# Patient Record
(demographics unavailable — no encounter records)

---

## 2024-10-15 NOTE — REASON FOR VISIT
[FreeTextEntry1] : 82 year-old female with PAF on Holter, SSS s/p PPM ([employer]) 6/9/17 at [facility], aortic aneurysm (4.2 cm), HTN, HLD, presents for followup.    Patient was last seen on 7/2/24- Patient denies CP, SOB, palpitations, or lightheadedness.   She is on Eliquis 2.5 mg BID for stroke prevention. She is on Diltiazem 120 mg for AFIB suppression and rate control. She is on Losartan 50 mg for HTN.  Patient underwent an echocardiogram 10/5/23 and it showed normal LV function, mild dilatation of the ascending aorta (4.2 cm), without significant valvular pathology.   Patient underwent a treadmill stress test 7/11/22 and completed 6.5 minutes of Lobo protocol.  There was no ECG evidence of ischemia.  Following treadmill stress, there was no echocardiographic evidence of ischemia.  Pt underwent a CXR on 7/20/22 and it showed scarring within right mid lung and left lung base.   Patient underwent an echocardiogram 4/20/22 and it showed normal LV function, mild dilatation of the ascending aorta (4.1 cm), without significant valvular pathology.  She is on Eliquis 2.5 mg BID for stroke prevention.  She is on Diltiazem 120 mg for AFIB suppression and rate control.  She is on Losartan 25 mg for HTN.  Patient underwent an echocardiogram 7/16/20 and it showed normal LV function, mild dilatation of the ascending aorta (3.9 cm), without significant valvular pathology.

## 2024-10-15 NOTE — REASON FOR VISIT
[FreeTextEntry1] : 82 year-old female with PAF on Holter, SSS s/p PPM ([employer]) 6/9/17 at [facility], aortic aneurysm (4.2 cm), HTN, HLD, presents for followup.    Patient was last seen on 7/2/24- Patient denies CP, SOB, palpitations, or lightheadedness.   She is on Eliquis 2.5 mg BID for stroke prevention. She is on Diltiazem 120 mg for AFIB suppression and rate control. She is on Losartan 50 mg for HTN.  Patient underwent an echocardiogram 10/5/23 and it showed normal LV function, mild dilatation of the ascending aorta (4.2 cm), without significant valvular pathology.   Patient underwent a treadmill stress test 7/11/22 and completed 6.5 minutes of Lobo protocol.  There was no ECG evidence of ischemia.  Following treadmill stress, there was no echocardiographic evidence of ischemia.  Pt underwent a CXR on 7/20/22 and it showed scarring within right mid lung and left lung base.   Patient underwent an echocardiogram 4/20/22 and it showed normal LV function, mild dilatation of the ascending aorta (4.1 cm), without significant valvular pathology.  She is on Eliquis 2.5 mg BID for stroke prevention.  She is on Diltiazem 120 mg for AFIB suppression and rate control.  She is on Losartan 25 mg for HTN.  Patient underwent an echocardiogram 7/16/20 and it showed normal LV function, mild dilatation of the ascending aorta (3.9 cm), without significant valvular pathology.

## 2024-10-15 NOTE — HISTORY OF PRESENT ILLNESS
[FreeTextEntry1] : 10/16/24 -   7/2/24- Patient denies CP, SOB, palpitations, or lightheadedness.   4/17/24 - Patient reports home BP of 140-150. Patient denies CP, SOB, palpitations, or lightheadedness. BP is 160-160-160.    10/5/23 - Patient reports SOB when it is humid. Patient is now on Eliquis 5 mg BID. She is on Diltiazem for AFIB suppression and on Losartan 50 mg for HTN. Patient denies CP. Patient denies palpitations. Patient denies lightheadedness. I advised patient to undergo an echocardiogram.  Patient underwent an echocardiogram and it showed normal LV function, mild dilatation of the ascending aorta (4.2 cm), without significant valvular pathology.   3/8/23 - Patient  with PAF on Holter, SSS s/p PPM ([employer]), aortic aneurysm (4.0 cm), HTN, HLD, who needs cardiac clearance prior to EGD and colonoscopy. She is on Eliquis 2.5 mg BID for stroke prevention. She is on Diltiazem 120 mg for AFIB suppression and rate control. She is on Losartan 50 mg for HTN. Patient has been stable. Patient is cleared for EGD, colonoscopy, and anesthesia. She may hold Eliquis for 2 days prior to procedures.  7/11/22-  Patient feels increasing SOB. Yesterday for the whole day she cannot do any activity. Patient felt that it was due to poor sleep. I advised patient to undergo stress. Patient has not had her pacemaker checked. I advised patient to get appointment for pacemaker check. Patient would like to get oxygen tank but her oxygen saturation level is 96. I advised patient to undergo an echocardiogram and a treadmill stress test. I advised patient to get CXR.  Patient underwent a treadmill stress test and completed 6.5 minutes of Lobo protocol.  There was no ECG evidence of ischemia.  Following treadmill stress, there was no echocardiographic evidence of ischemia.  Pt underwent a CXR on 7/20/22 and it showed scarring within right mid lung and left lung base.    4/20/22 - Patient is feeling well.. Patient denies CP, SOB, palpitations, or lightheadedness. Patient had some difficulty walking due to recent fall. Patient has cough from hay fever.I advised patient to undergo an echocardiogram to followup on aortic aneurysm.  Patient underwent an echocardiogram and it showed normal LV function, mild dilatation of the ascending aorta (4.1 cm), without significant valvular pathology.  FU in 6 months.    10/7/21 - Patient has been stable.  Patient denies CP, SOB, palpitations, or lightheadedness.  Her BP was good.  I advised patient to continue current medications.  Her PPM was interrogated and battery life is 5.5 years.  FU 6 months.  She also needs cardiac clearance prior to cataract surgery.  4/1/21 - Patient has been stable.  Patient denies CP, SOB, palpitations, or lightheadedness.  Her BP was elevated.  Patient reports that her BP is around 140 at home.  I advised patient to start Losartan 25 mg.  Dose may need to be increased if BP remains borderline at home.  FU 6 months.   7/16/20 - Patient reports SOB with exertion for 2 days lasting half a day and sharp L CP lasting seconds. Patient denies lightheadedness. Patient denies palpitations. I advised patient to undergo an echocardiogram and a treadmill stress test.   7/19/17 - 74-year-old female with PAF on Holter, SSS s/p PPM , bradycardia, HTN and HLD presents for followup. Patient was last seen on 4/6/17 for PAF and bradycardia on Holter. She was advised to add Propafenone 150 mg BID. She was advised to see EP for possible PPM to allow us to give her beta-blocker. Patient underwent PPM placement on 6/9/17. Patient reports occasional CP and SOB not related to exertion. Patient reports decreased palpitations. She is on Eliquis for stroke prevention. She is on Diltiazem 30 mg TID and Propafenone 150 mg BID for AFIB suppression. She takes Valsartan HCTZ 80-12.5 mg for HTN.  1/4/17 initial visit - 74-year-old female with HTN and HLD presents for evaluation of abnormal ECG and palpitations. Patient reports no cardiac history. She was noted to have an abnormal ECG, showing aberrantly conducted APC's vs. PVC's. Patient denies chest pain. Patient reports SOB with exertion. Patient reports a lng history of anxiety attacks, described as palpitations and fast heartbeats lasting minutes. Patient denies history of syncope.

## 2024-10-15 NOTE — HISTORY OF PRESENT ILLNESS
[FreeTextEntry1] : 10/16/24 -   7/2/24- Patient denies CP, SOB, palpitations, or lightheadedness.   4/17/24 - Patient reports home BP of 140-150. Patient denies CP, SOB, palpitations, or lightheadedness. BP is 160-160-160.    10/5/23 - Patient reports SOB when it is humid. Patient is now on Eliquis 5 mg BID. She is on Diltiazem for AFIB suppression and on Losartan 50 mg for HTN. Patient denies CP. Patient denies palpitations. Patient denies lightheadedness. I advised patient to undergo an echocardiogram.  Patient underwent an echocardiogram and it showed normal LV function, mild dilatation of the ascending aorta (4.2 cm), without significant valvular pathology.   3/8/23 - Patient  with PAF on Holter, SSS s/p PPM ([employer]), aortic aneurysm (4.0 cm), HTN, HLD, who needs cardiac clearance prior to EGD and colonoscopy. She is on Eliquis 2.5 mg BID for stroke prevention. She is on Diltiazem 120 mg for AFIB suppression and rate control. She is on Losartan 50 mg for HTN. Patient has been stable. Patient is cleared for EGD, colonoscopy, and anesthesia. She may hold Eliquis for 2 days prior to procedures.  7/11/22-  Patient feels increasing SOB. Yesterday for the whole day she cannot do any activity. Patient felt that it was due to poor sleep. I advised patient to undergo stress. Patient has not had her pacemaker checked. I advised patient to get appointment for pacemaker check. Patient would like to get oxygen tank but her oxygen saturation level is 96. I advised patient to undergo an echocardiogram and a treadmill stress test. I advised patient to get CXR.  Patient underwent a treadmill stress test and completed 6.5 minutes of Lobo protocol.  There was no ECG evidence of ischemia.  Following treadmill stress, there was no echocardiographic evidence of ischemia.  Pt underwent a CXR on 7/20/22 and it showed scarring within right mid lung and left lung base.    4/20/22 - Patient is feeling well.. Patient denies CP, SOB, palpitations, or lightheadedness. Patient had some difficulty walking due to recent fall. Patient has cough from hay fever.I advised patient to undergo an echocardiogram to followup on aortic aneurysm.  Patient underwent an echocardiogram and it showed normal LV function, mild dilatation of the ascending aorta (4.1 cm), without significant valvular pathology.  FU in 6 months.    10/7/21 - Patient has been stable.  Patient denies CP, SOB, palpitations, or lightheadedness.  Her BP was good.  I advised patient to continue current medications.  Her PPM was interrogated and battery life is 5.5 years.  FU 6 months.  She also needs cardiac clearance prior to cataract surgery.  4/1/21 - Patient has been stable.  Patient denies CP, SOB, palpitations, or lightheadedness.  Her BP was elevated.  Patient reports that her BP is around 140 at home.  I advised patient to start Losartan 25 mg.  Dose may need to be increased if BP remains borderline at home.  FU 6 months.   7/16/20 - Patient reports SOB with exertion for 2 days lasting half a day and sharp L CP lasting seconds. Patient denies lightheadedness. Patient denies palpitations. I advised patient to undergo an echocardiogram and a treadmill stress test.   7/19/17 - 74-year-old female with PAF on Holter, SSS s/p PPM , bradycardia, HTN and HLD presents for followup. Patient was last seen on 4/6/17 for PAF and bradycardia on Holter. She was advised to add Propafenone 150 mg BID. She was advised to see EP for possible PPM to allow us to give her beta-blocker. Patient underwent PPM placement on 6/9/17. Patient reports occasional CP and SOB not related to exertion. Patient reports decreased palpitations. She is on Eliquis for stroke prevention. She is on Diltiazem 30 mg TID and Propafenone 150 mg BID for AFIB suppression. She takes Valsartan HCTZ 80-12.5 mg for HTN.  1/4/17 initial visit - 74-year-old female with HTN and HLD presents for evaluation of abnormal ECG and palpitations. Patient reports no cardiac history. She was noted to have an abnormal ECG, showing aberrantly conducted APC's vs. PVC's. Patient denies chest pain. Patient reports SOB with exertion. Patient reports a lng history of anxiety attacks, described as palpitations and fast heartbeats lasting minutes. Patient denies history of syncope.

## 2025-05-15 NOTE — PHYSICAL EXAM
[General Appearance - Well Developed] : well developed [Normal Appearance] : normal appearance [Well Groomed] : well groomed [General Appearance - Well Nourished] : well nourished [No Deformities] : no deformities [General Appearance - In No Acute Distress] : no acute distress [Normal Conjunctiva] : the conjunctiva exhibited no abnormalities [Eyelids - No Xanthelasma] : the eyelids demonstrated no xanthelasmas [Normal Oral Mucosa] : normal oral mucosa [No Oral Pallor] : no oral pallor [No Oral Cyanosis] : no oral cyanosis [Normal Jugular Venous A Waves Present] : normal jugular venous A waves present [Normal Jugular Venous V Waves Present] : normal jugular venous V waves present [No Jugular Venous Ac A Waves] : no jugular venous ac A waves [Respiration, Rhythm And Depth] : normal respiratory rhythm and effort [Exaggerated Use Of Accessory Muscles For Inspiration] : no accessory muscle use [Auscultation Breath Sounds / Voice Sounds] : lungs were clear to auscultation bilaterally [Heart Rate And Rhythm] : heart rate and rhythm were normal [Heart Sounds] : normal S1 and S2 [Murmurs] : no murmurs present [Arterial Pulses Normal] : the arterial pulses were normal [Edema] : no peripheral edema present [Abdomen Soft] : soft [Abdomen Tenderness] : non-tender [Abdomen Mass (___ Cm)] : no abdominal mass palpated [Abnormal Walk] : normal gait [Gait - Sufficient For Exercise Testing] : the gait was sufficient for exercise testing [Nail Clubbing] : no clubbing of the fingernails [Cyanosis, Localized] : no localized cyanosis [Petechial Hemorrhages (___cm)] : no petechial hemorrhages [] : no ischemic changes [Oriented To Time, Place, And Person] : oriented to person, place, and time [Affect] : the affect was normal [Mood] : the mood was normal [No Anxiety] : not feeling anxious

## 2025-05-16 NOTE — HISTORY OF PRESENT ILLNESS
[FreeTextEntry1] : Betsy Small is an 82-year-old female with PMH of TBS s/p dual chamber [employer] pacemaker implant 6/9/17, PAF (on Eliquis), aortic aneurysm (4.1 cm), HTN and HLD presents for routine device interrogation. Patient without complaints. She denies chest pain, SOB, palpitation, lightheadedness, or syncope. When travels by jet to [place], she develops lower extremity edema. Of note, her ventricular pacing thresholds are chronically high; however, the pacemaker rarely paces in the ventricle since she has sinus node dysfunction and not heart block.

## 2025-05-16 NOTE — DISCUSSION/SUMMARY
[EKG obtained to assist in diagnosis and management of assessed problem(s)] : EKG obtained to assist in diagnosis and management of assessed problem(s) [FreeTextEntry1] : 1. TBS: s/p dual chamber [employer] PPM placement. EKG performed today to assess for appropriate pacing and reveals atrial pacing with V sensing. Device check today revealed PPM in good working status with adequate pacing/sensing thresholds. Note that her RV pacing thresholds are chronically elevated, V paces <0.1%. Not pacemaker dependent. Underlying rhythm: SB/SR 50's. Stored data revealed a brief run of PAT in January 2025. Follow up in clinic in 6 months for device check. Attempt to get patient remote monitoring. Two years to DARLENE.   2. Hx PAF. [id number] Score of 4. Currently in SR. Continue Eliquis 5 mg BID for stroke prevention (given age 80, Wt> 60Kg, Crt <1.5).  3. HTN: Resume oral antihypertensives as prescribed. Encouraged heart healthy diet and sodium restriction. Continue regular f/u with Cardiologist for further HTN management.  Sincerely,  Lobo Sahni MD.

## 2025-05-16 NOTE — DISCUSSION/SUMMARY
[EKG obtained to assist in diagnosis and management of assessed problem(s)] : EKG obtained to assist in diagnosis and management of assessed problem(s) [FreeTextEntry1] : 1. TBS: s/p dual chamber [employer] PPM placement. EKG performed today to assess for appropriate pacing and reveals atrial pacing with V sensing. Device check today revealed PPM in good working status with adequate pacing/sensing thresholds. Note that her RV pacing thresholds are chronically elevated, V paces <0.1%. Not pacemaker dependent. Underlying rhythm: SB/SR 50's. Stored data revealed a brief run of PAT in January 2025. Follow up in clinic in 6 months for device check. Attempt to get patient remote monitoring. Two years to DARLENE.   2. Hx PAF. [id number] Score of 4. Currently in SR. Continue Eliquis 5 mg BID for stroke prevention (given age 80, Wt> 60Kg, Crt <1.5).  3. HTN: Resume oral antihypertensives as prescribed. Encouraged heart healthy diet and sodium restriction. Continue regular f/u with Cardiologist for further HTN management.  Sincerely,  Lobo Sahni MD.

## 2025-05-16 NOTE — PHYSICAL EXAM
[Well Developed] : well developed [Well Nourished] : well nourished [No Acute Distress] : no acute distress [Normal Conjunctiva] : normal conjunctiva [Normal Venous Pressure] : normal venous pressure [No Carotid Bruit] : no carotid bruit [Normal S1, S2] : normal S1, S2 [No Murmur] : no murmur [No Rub] : no rub [No Gallop] : no gallop [Clear Lung Fields] : clear lung fields [Good Air Entry] : good air entry [No Respiratory Distress] : no respiratory distress  [Soft] : abdomen soft [Non Tender] : non-tender [No Masses/organomegaly] : no masses/organomegaly [Normal Bowel Sounds] : normal bowel sounds [Normal Gait] : normal gait [No Edema] : no edema [No Cyanosis] : no cyanosis [No Clubbing] : no clubbing [No Varicosities] : no varicosities [No Rash] : no rash [No Skin Lesions] : no skin lesions [Moves all extremities] : moves all extremities [No Focal Deficits] : no focal deficits [Normal Speech] : normal speech [Alert and Oriented] : alert and oriented [Normal memory] : normal memory [Abnormal Gait] : abnormal gait [de-identified] : Uses cane

## 2025-05-16 NOTE — PHYSICAL EXAM
[Well Developed] : well developed [Well Nourished] : well nourished [No Acute Distress] : no acute distress [Normal Conjunctiva] : normal conjunctiva [Normal Venous Pressure] : normal venous pressure [No Carotid Bruit] : no carotid bruit [Normal S1, S2] : normal S1, S2 [No Murmur] : no murmur [No Rub] : no rub [No Gallop] : no gallop [Clear Lung Fields] : clear lung fields [Good Air Entry] : good air entry [No Respiratory Distress] : no respiratory distress  [Soft] : abdomen soft [Non Tender] : non-tender [No Masses/organomegaly] : no masses/organomegaly [Normal Bowel Sounds] : normal bowel sounds [Normal Gait] : normal gait [No Edema] : no edema [No Cyanosis] : no cyanosis [No Clubbing] : no clubbing [No Varicosities] : no varicosities [No Rash] : no rash [No Skin Lesions] : no skin lesions [Moves all extremities] : moves all extremities [No Focal Deficits] : no focal deficits [Normal Speech] : normal speech [Alert and Oriented] : alert and oriented [Normal memory] : normal memory [Abnormal Gait] : abnormal gait [de-identified] : Uses cane

## 2025-05-16 NOTE — PHYSICAL EXAM
[Well Developed] : well developed [Well Nourished] : well nourished [No Acute Distress] : no acute distress [Normal Conjunctiva] : normal conjunctiva [Normal Venous Pressure] : normal venous pressure [No Carotid Bruit] : no carotid bruit [Normal S1, S2] : normal S1, S2 [No Murmur] : no murmur [No Rub] : no rub [No Gallop] : no gallop [Clear Lung Fields] : clear lung fields [Good Air Entry] : good air entry [No Respiratory Distress] : no respiratory distress  [Soft] : abdomen soft [Non Tender] : non-tender [No Masses/organomegaly] : no masses/organomegaly [Normal Bowel Sounds] : normal bowel sounds [Normal Gait] : normal gait [No Edema] : no edema [No Cyanosis] : no cyanosis [No Clubbing] : no clubbing [No Varicosities] : no varicosities [No Rash] : no rash [No Skin Lesions] : no skin lesions [Moves all extremities] : moves all extremities [No Focal Deficits] : no focal deficits [Normal Speech] : normal speech [Alert and Oriented] : alert and oriented [Normal memory] : normal memory [Abnormal Gait] : abnormal gait [de-identified] : Uses cane

## 2025-05-16 NOTE — DISCUSSION/SUMMARY
[EKG obtained to assist in diagnosis and management of assessed problem(s)] : EKG obtained to assist in diagnosis and management of assessed problem(s) [FreeTextEntry1] : 1. TBS: s/p dual chamber [employer] PPM placement. EKG performed today to assess for appropriate pacing and reveals atrial pacing with V sensing. Device check today revealed PPM in good working status with adequate pacing/sensing thresholds. Note that her RV pacing thresholds are chronically elevated, V paces <0.1%. Not pacemaker dependent. Underlying rhythm: SB/SR 50's. Stored data revealed a brief run of PAT in January 2025. Follow up in clinic in 6 months for device check. Attempt to get patient remote monitoring. Two years to DARLENE.   2. Hx PAF. [id number] Score of 4. Currently in SR. Continue Eliquis 5 mg BID for stroke prevention (given age 80, Wt> 60Kg, Crt <1.5).  3. HTN: Resume oral antihypertensives as prescribed. Encouraged heart healthy diet and sodium restriction. Continue regular f/u with Cardiologist for further HTN management.  Sincerely,  Lobo Sahni MD.

## 2025-05-23 NOTE — HISTORY OF PRESENT ILLNESS
[FreeTextEntry1] : 5/19/25 - Please refer to NP note below.  Pt is here for follow up. Pt has been stable. Pt swims or goes to gym daily for 45 minutes without limitations. Patient denies CP, SOB, palpitations, or lightheadedness. Patient denies h/o syncope. Her home SBP ranged 120-130. Today's /76 P 56.  Pt is on Eliquis 2.5 mg BID for stroke prevention. She is on Diltiazem 120 mg for AFIB suppression and rate control. She is on Losartan 50 mg for HTN.  (1) PAF on Holter, SSS s/p PPM ([employer]) 6/9/17 at [facility] - Patient has been stable.    (2) Aortic aneurysm (4.2 cm) - I advised patient to undergo an echocardiogram.   (3) HTN - Her BP was good.   (4) followup -    10/16/24 - Patient has been okay. Patient denies CP, SOB, palpitations, or lightheadedness. FU in 6 months.   7/2/24- Patient denies CP, SOB, palpitations, or lightheadedness.   4/17/24 - Patient reports home BP of 140-150. Patient denies CP, SOB, palpitations, or lightheadedness. BP is 160-160-160.    10/5/23 - Patient reports SOB when it is humid. Patient is now on Eliquis 5 mg BID. She is on Diltiazem for AFIB suppression and on Losartan 50 mg for HTN. Patient denies CP. Patient denies palpitations. Patient denies lightheadedness. I advised patient to undergo an echocardiogram.  Patient underwent an echocardiogram and it showed normal LV function, mild dilatation of the ascending aorta (4.2 cm), without significant valvular pathology.   3/8/23 - Patient  with PAF on Holter, SSS s/p PPM ([employer]), aortic aneurysm (4.0 cm), HTN, HLD, who needs cardiac clearance prior to EGD and colonoscopy. She is on Eliquis 2.5 mg BID for stroke prevention. She is on Diltiazem 120 mg for AFIB suppression and rate control. She is on Losartan 50 mg for HTN. Patient has been stable. Patient is cleared for EGD, colonoscopy, and anesthesia. She may hold Eliquis for 2 days prior to procedures.  7/11/22-  Patient feels increasing SOB. Yesterday for the whole day she cannot do any activity. Patient felt that it was due to poor sleep. I advised patient to undergo stress. Patient has not had her pacemaker checked. I advised patient to get appointment for pacemaker check. Patient would like to get oxygen tank but her oxygen saturation level is 96. I advised patient to undergo an echocardiogram and a treadmill stress test. I advised patient to get CXR.  Patient underwent a treadmill stress test and completed 6.5 minutes of Lobo protocol.  There was no ECG evidence of ischemia.  Following treadmill stress, there was no echocardiographic evidence of ischemia.  Pt underwent a CXR on 7/20/22 and it showed scarring within right mid lung and left lung base.    4/20/22 - Patient is feeling well.. Patient denies CP, SOB, palpitations, or lightheadedness. Patient had some difficulty walking due to recent fall. Patient has cough from hay fever.I advised patient to undergo an echocardiogram to followup on aortic aneurysm.  Patient underwent an echocardiogram and it showed normal LV function, mild dilatation of the ascending aorta (4.1 cm), without significant valvular pathology.  FU in 6 months.    10/7/21 - Patient has been stable.  Patient denies CP, SOB, palpitations, or lightheadedness.  Her BP was good.  I advised patient to continue current medications.  Her PPM was interrogated and battery life is 5.5 years.  FU 6 months.  She also needs cardiac clearance prior to cataract surgery.  4/1/21 - Patient has been stable.  Patient denies CP, SOB, palpitations, or lightheadedness.  Her BP was elevated.  Patient reports that her BP is around 140 at home.  I advised patient to start Losartan 25 mg.  Dose may need to be increased if BP remains borderline at home.  FU 6 months.   7/16/20 - Patient reports SOB with exertion for 2 days lasting half a day and sharp L CP lasting seconds. Patient denies lightheadedness. Patient denies palpitations. I advised patient to undergo an echocardiogram and a treadmill stress test.   7/19/17 - 74-year-old female with PAF on Holter, SSS s/p PPM , bradycardia, HTN and HLD presents for followup. Patient was last seen on 4/6/17 for PAF and bradycardia on Holter. She was advised to add Propafenone 150 mg BID. She was advised to see EP for possible PPM to allow us to give her beta-blocker. Patient underwent PPM placement on 6/9/17. Patient reports occasional CP and SOB not related to exertion. Patient reports decreased palpitations. She is on Eliquis for stroke prevention. She is on Diltiazem 30 mg TID and Propafenone 150 mg BID for AFIB suppression. She takes Valsartan HCTZ 80-12.5 mg for HTN.  1/4/17 initial visit - 74-year-old female with HTN and HLD presents for evaluation of abnormal ECG and palpitations. Patient reports no cardiac history. She was noted to have an abnormal ECG, showing aberrantly conducted APC's vs. PVC's. Patient denies chest pain. Patient reports SOB with exertion. Patient reports a lng history of anxiety attacks, described as palpitations and fast heartbeats lasting minutes. Patient denies history of syncope.

## 2025-05-23 NOTE — REASON FOR VISIT
[FreeTextEntry1] : 82 year-old female with PAF on Holter, SSS s/p PPM ([employer]) 6/9/17 at [facility], aortic aneurysm (4.2 cm), HTN, HLD, presents for followup.    Patient was last seen on 10/16/24 -> Patient has been okay. Patient denies CP, SOB, palpitations, or lightheadedness. FU in 6 months.   She is on Eliquis 2.5 mg BID for stroke prevention. She is on Diltiazem 120 mg for AFIB suppression and rate control. She is on Losartan 50 mg for HTN.  Patient underwent an echocardiogram 10/5/23 and it showed normal LV function, mild dilatation of the ascending aorta (4.2 cm), without significant valvular pathology.   Patient underwent a treadmill stress test 7/11/22 and completed 6.5 minutes of Lobo protocol.  There was no ECG evidence of ischemia.  Following treadmill stress, there was no echocardiographic evidence of ischemia.  Pt underwent a CXR on 7/20/22 and it showed scarring within right mid lung and left lung base.   Patient underwent an echocardiogram 4/20/22 and it showed normal LV function, mild dilatation of the ascending aorta (4.1 cm), without significant valvular pathology.  She is on Eliquis 2.5 mg BID for stroke prevention.  She is on Diltiazem 120 mg for AFIB suppression and rate control.  She is on Losartan 25 mg for HTN.  Patient underwent an echocardiogram 7/16/20 and it showed normal LV function, mild dilatation of the ascending aorta (3.9 cm), without significant valvular pathology.

## 2025-05-23 NOTE — HISTORY OF PRESENT ILLNESS
[FreeTextEntry1] : 5/19/25 - Please refer to NP note below.  Pt is here for follow up. Pt has been stable. Pt swims or goes to gym daily for 45 minutes without limitations. Patient denies CP, SOB, palpitations, or lightheadedness. Patient denies h/o syncope. Her home SBP ranged 120-130. Today's /76 P 56.  Pt is on Eliquis 2.5 mg BID for stroke prevention. She is on Diltiazem 120 mg for AFIB suppression and rate control. She is on Losartan 50 mg for HTN.  (1) PAF on Holter, SSS s/p PPM ([employer]) 6/9/17 at [facility] - Patient has been stable.    (2) Aortic aneurysm (4.2 cm) - I advised patient to undergo an echocardiogram.   (3) HTN - Her BP was good.   (4) followup -    10/16/24 - Patient has been okay. Patient denies CP, SOB, palpitations, or lightheadedness. FU in 6 months.   7/2/24- Patient denies CP, SOB, palpitations, or lightheadedness.   4/17/24 - Patient reports home BP of 140-150. Patient denies CP, SOB, palpitations, or lightheadedness. BP is 160-160-160.    10/5/23 - Patient reports SOB when it is humid. Patient is now on Eliquis 5 mg BID. She is on Diltiazem for AFIB suppression and on Losartan 50 mg for HTN. Patient denies CP. Patient denies palpitations. Patient denies lightheadedness. I advised patient to undergo an echocardiogram.  Patient underwent an echocardiogram and it showed normal LV function, mild dilatation of the ascending aorta (4.2 cm), without significant valvular pathology.   3/8/23 - Patient  with PAF on Holter, SSS s/p PPM ([employer]), aortic aneurysm (4.0 cm), HTN, HLD, who needs cardiac clearance prior to EGD and colonoscopy. She is on Eliquis 2.5 mg BID for stroke prevention. She is on Diltiazem 120 mg for AFIB suppression and rate control. She is on Losartan 50 mg for HTN. Patient has been stable. Patient is cleared for EGD, colonoscopy, and anesthesia. She may hold Eliquis for 2 days prior to procedures.  7/11/22-  Patient feels increasing SOB. Yesterday for the whole day she cannot do any activity. Patient felt that it was due to poor sleep. I advised patient to undergo stress. Patient has not had her pacemaker checked. I advised patient to get appointment for pacemaker check. Patient would like to get oxygen tank but her oxygen saturation level is 96. I advised patient to undergo an echocardiogram and a treadmill stress test. I advised patient to get CXR.  Patient underwent a treadmill stress test and completed 6.5 minutes of Lobo protocol.  There was no ECG evidence of ischemia.  Following treadmill stress, there was no echocardiographic evidence of ischemia.  Pt underwent a CXR on 7/20/22 and it showed scarring within right mid lung and left lung base.    4/20/22 - Patient is feeling well.. Patient denies CP, SOB, palpitations, or lightheadedness. Patient had some difficulty walking due to recent fall. Patient has cough from hay fever.I advised patient to undergo an echocardiogram to followup on aortic aneurysm.  Patient underwent an echocardiogram and it showed normal LV function, mild dilatation of the ascending aorta (4.1 cm), without significant valvular pathology.  FU in 6 months.    10/7/21 - Patient has been stable.  Patient denies CP, SOB, palpitations, or lightheadedness.  Her BP was good.  I advised patient to continue current medications.  Her PPM was interrogated and battery life is 5.5 years.  FU 6 months.  She also needs cardiac clearance prior to cataract surgery.  4/1/21 - Patient has been stable.  Patient denies CP, SOB, palpitations, or lightheadedness.  Her BP was elevated.  Patient reports that her BP is around 140 at home.  I advised patient to start Losartan 25 mg.  Dose may need to be increased if BP remains borderline at home.  FU 6 months.   7/16/20 - Patient reports SOB with exertion for 2 days lasting half a day and sharp L CP lasting seconds. Patient denies lightheadedness. Patient denies palpitations. I advised patient to undergo an echocardiogram and a treadmill stress test.   7/19/17 - 74-year-old female with PAF on Holter, SSS s/p PPM , bradycardia, HTN and HLD presents for followup. Patient was last seen on 4/6/17 for PAF and bradycardia on Holter. She was advised to add Propafenone 150 mg BID. She was advised to see EP for possible PPM to allow us to give her beta-blocker. Patient underwent PPM placement on 6/9/17. Patient reports occasional CP and SOB not related to exertion. Patient reports decreased palpitations. She is on Eliquis for stroke prevention. She is on Diltiazem 30 mg TID and Propafenone 150 mg BID for AFIB suppression. She takes Valsartan HCTZ 80-12.5 mg for HTN.  1/4/17 initial visit - 74-year-old female with HTN and HLD presents for evaluation of abnormal ECG and palpitations. Patient reports no cardiac history. She was noted to have an abnormal ECG, showing aberrantly conducted APC's vs. PVC's. Patient denies chest pain. Patient reports SOB with exertion. Patient reports a lng history of anxiety attacks, described as palpitations and fast heartbeats lasting minutes. Patient denies history of syncope.

## 2025-05-23 NOTE — HISTORY OF PRESENT ILLNESS
[FreeTextEntry1] : 5/19/25 - Please refer to NP note below.  Pt is here for follow up. Pt has been stable. Pt swims or goes to gym daily for 45 minutes without limitations. Patient denies CP, SOB, palpitations, or lightheadedness. Patient denies h/o syncope. Her home SBP ranged 120-130. Today's /76 P 56.  Pt is on Eliquis 2.5 mg BID for stroke prevention. She is on Diltiazem 120 mg for AFIB suppression and rate control. She is on Losartan 50 mg for HTN.  (1) PAF on Holter, SSS s/p PPM ([employer]) 6/9/17 at [facility] - Patient has been stable.    (2) Aortic aneurysm (4.2 cm) - I advised patient to undergo an echocardiogram.   (3) HTN - Her BP was good.   (4) followup -    10/16/24 - Patient has been okay. Patient denies CP, SOB, palpitations, or lightheadedness. FU in 6 months.   7/2/24- Patient denies CP, SOB, palpitations, or lightheadedness.   4/17/24 - Patient reports home BP of 140-150. Patient denies CP, SOB, palpitations, or lightheadedness. BP is 160-160-160.    10/5/23 - Patient reports SOB when it is humid. Patient is now on Eliquis 5 mg BID. She is on Diltiazem for AFIB suppression and on Losartan 50 mg for HTN. Patient denies CP. Patient denies palpitations. Patient denies lightheadedness. I advised patient to undergo an echocardiogram.  Patient underwent an echocardiogram and it showed normal LV function, mild dilatation of the ascending aorta (4.2 cm), without significant valvular pathology.   3/8/23 - Patient  with PAF on Holter, SSS s/p PPM ([employer]), aortic aneurysm (4.0 cm), HTN, HLD, who needs cardiac clearance prior to EGD and colonoscopy. She is on Eliquis 2.5 mg BID for stroke prevention. She is on Diltiazem 120 mg for AFIB suppression and rate control. She is on Losartan 50 mg for HTN. Patient has been stable. Patient is cleared for EGD, colonoscopy, and anesthesia. She may hold Eliquis for 2 days prior to procedures.  7/11/22-  Patient feels increasing SOB. Yesterday for the whole day she cannot do any activity. Patient felt that it was due to poor sleep. I advised patient to undergo stress. Patient has not had her pacemaker checked. I advised patient to get appointment for pacemaker check. Patient would like to get oxygen tank but her oxygen saturation level is 96. I advised patient to undergo an echocardiogram and a treadmill stress test. I advised patient to get CXR.  Patient underwent a treadmill stress test and completed 6.5 minutes of Lobo protocol.  There was no ECG evidence of ischemia.  Following treadmill stress, there was no echocardiographic evidence of ischemia.  Pt underwent a CXR on 7/20/22 and it showed scarring within right mid lung and left lung base.    4/20/22 - Patient is feeling well.. Patient denies CP, SOB, palpitations, or lightheadedness. Patient had some difficulty walking due to recent fall. Patient has cough from hay fever.I advised patient to undergo an echocardiogram to followup on aortic aneurysm.  Patient underwent an echocardiogram and it showed normal LV function, mild dilatation of the ascending aorta (4.1 cm), without significant valvular pathology.  FU in 6 months.    10/7/21 - Patient has been stable.  Patient denies CP, SOB, palpitations, or lightheadedness.  Her BP was good.  I advised patient to continue current medications.  Her PPM was interrogated and battery life is 5.5 years.  FU 6 months.  She also needs cardiac clearance prior to cataract surgery.  4/1/21 - Patient has been stable.  Patient denies CP, SOB, palpitations, or lightheadedness.  Her BP was elevated.  Patient reports that her BP is around 140 at home.  I advised patient to start Losartan 25 mg.  Dose may need to be increased if BP remains borderline at home.  FU 6 months.   7/16/20 - Patient reports SOB with exertion for 2 days lasting half a day and sharp L CP lasting seconds. Patient denies lightheadedness. Patient denies palpitations. I advised patient to undergo an echocardiogram and a treadmill stress test.   7/19/17 - 74-year-old female with PAF on Holter, SSS s/p PPM , bradycardia, HTN and HLD presents for followup. Patient was last seen on 4/6/17 for PAF and bradycardia on Holter. She was advised to add Propafenone 150 mg BID. She was advised to see EP for possible PPM to allow us to give her beta-blocker. Patient underwent PPM placement on 6/9/17. Patient reports occasional CP and SOB not related to exertion. Patient reports decreased palpitations. She is on Eliquis for stroke prevention. She is on Diltiazem 30 mg TID and Propafenone 150 mg BID for AFIB suppression. She takes Valsartan HCTZ 80-12.5 mg for HTN.  1/4/17 initial visit - 74-year-old female with HTN and HLD presents for evaluation of abnormal ECG and palpitations. Patient reports no cardiac history. She was noted to have an abnormal ECG, showing aberrantly conducted APC's vs. PVC's. Patient denies chest pain. Patient reports SOB with exertion. Patient reports a lng history of anxiety attacks, described as palpitations and fast heartbeats lasting minutes. Patient denies history of syncope.

## 2025-05-23 NOTE — REASON FOR VISIT
[FreeTextEntry1] : 82 year-old female with PAF on Holter, SSS s/p PPM ([employer]) 6/9/17 at [facility], aortic aneurysm (4.2 cm), HTN, HLD, presents for followup.    Patient was last seen on 10/16/24 -> Patient has been okay. Patient denies CP, SOB, palpitations, or lightheadedness. FU in 6 months.   She is on Eliquis 2.5 mg BID for stroke prevention. She is on Diltiazem 120 mg for AFIB suppression and rate control. She is on Losartan 50 mg for HTN.  Patient underwent an echocardiogram 10/5/23 and it showed normal LV function, mild dilatation of the ascending aorta (4.2 cm), without significant valvular pathology.   Patient underwent a treadmill stress test 7/11/22 and completed 6.5 minutes of Lobo protocol.  There was no ECG evidence of ischemia.  Following treadmill stress, there was no echocardiographic evidence of ischemia.  Pt underwent a CXR on 7/20/22 and it showed scarring within right mid lung and left lung base.   Patient underwent an echocardiogram 4/20/22 and it showed normal LV function, mild dilatation of the ascending aorta (4.1 cm), without significant valvular pathology.  She is on Eliquis 2.5 mg BID for stroke prevention.  She is on Diltiazem 120 mg for AFIB suppression and rate control.  She is on Losartan 25 mg for HTN.  Patient underwent an echocardiogram 7/16/20 and it showed normal LV function, mild dilatation of the ascending aorta (3.9 cm), without significant valvular pathology.

## 2025-05-23 NOTE — REASON FOR VISIT
[FreeTextEntry1] : 82 year-old female with PAF on Holter, SSS s/p PPM ([employer]) 6/9/17 at [facility], aortic aneurysm (4.2 cm), HTN, HLD, presents for followup.    Patient was last seen on 10/16/24 -> Patient has been okay. Patient denies CP, SOB, palpitations, or lightheadedness. FU in 6 months.   She is on Eliquis 2.5 mg BID for stroke prevention. She is on Diltiazem 120 mg for AFIB suppression and rate control. She is on Losartan 50 mg for HTN.  Patient underwent an echocardiogram 10/5/23 and it showed normal LV function, mild dilatation of the ascending aorta (4.2 cm), without significant valvular pathology.   Patient underwent a treadmill stress test 7/11/22 and completed 6.5 minutes of Lobo protocol.  There was no ECG evidence of ischemia.  Following treadmill stress, there was no echocardiographic evidence of ischemia.  Pt underwent a CXR on 7/20/22 and it showed scarring within right mid lung and left lung base.   Patient underwent an echocardiogram 4/20/22 and it showed normal LV function, mild dilatation of the ascending aorta (4.1 cm), without significant valvular pathology.  She is on Eliquis 2.5 mg BID for stroke prevention.  She is on Diltiazem 120 mg for AFIB suppression and rate control.  She is on Losartan 25 mg for HTN.  Patient underwent an echocardiogram 7/16/20 and it showed normal LV function, mild dilatation of the ascending aorta (3.9 cm), without significant valvular pathology.